# Patient Record
Sex: MALE | Race: WHITE | URBAN - METROPOLITAN AREA
[De-identification: names, ages, dates, MRNs, and addresses within clinical notes are randomized per-mention and may not be internally consistent; named-entity substitution may affect disease eponyms.]

---
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
## 2025-07-11 ENCOUNTER — OFFICE VISIT (OUTPATIENT)
Age: 20
End: 2025-07-11
Payer: COMMERCIAL

## 2025-07-11 VITALS
HEIGHT: 70 IN | BODY MASS INDEX: 34.28 KG/M2 | WEIGHT: 239.42 LBS | SYSTOLIC BLOOD PRESSURE: 130 MMHG | TEMPERATURE: 98.8 F | DIASTOLIC BLOOD PRESSURE: 70 MMHG | RESPIRATION RATE: 18 BRPM | OXYGEN SATURATION: 98 % | HEART RATE: 73 BPM

## 2025-07-11 DIAGNOSIS — J02.0 STREP PHARYNGITIS: Primary | ICD-10-CM

## 2025-07-11 LAB — S PYO AG THROAT QL: POSITIVE

## 2025-07-11 PROCEDURE — S9083 URGENT CARE CENTER GLOBAL: HCPCS | Performed by: EMERGENCY MEDICINE

## 2025-07-11 PROCEDURE — 87880 STREP A ASSAY W/OPTIC: CPT | Performed by: EMERGENCY MEDICINE

## 2025-07-11 PROCEDURE — G0382 LEV 3 HOSP TYPE B ED VISIT: HCPCS | Performed by: EMERGENCY MEDICINE

## 2025-07-11 RX ORDER — AMOXICILLIN 500 MG/1
500 CAPSULE ORAL EVERY 8 HOURS SCHEDULED
Qty: 30 CAPSULE | Refills: 0 | Status: SHIPPED | OUTPATIENT
Start: 2025-07-11 | End: 2025-07-21

## 2025-07-11 RX ORDER — PREDNISONE 10 MG/1
TABLET ORAL
Qty: 21 TABLET | Refills: 0 | Status: SHIPPED | OUTPATIENT
Start: 2025-07-11

## 2025-07-11 NOTE — PATIENT INSTRUCTIONS
Patient Education     Strep throat in adults   The Basics   Written by the doctors and editors at [facility]   What is strep throat? -- Strep throat is an infection caused by a certain type of bacteria. It leads to a sore throat.  Most sore throats are caused by a virus, and are not strep throat. Only about 1 in 10 adults who seek medical care for sore throat have strep throat. But if you do have strep throat, you will need treatment with antibiotics.  How can I tell if I have strep throat? -- It is hard to tell the difference between strep throat and a sore throat caused by a virus. But there are some clues that you can look for.  People who have strep throat often have:   Severe throat pain   Fever (temperature higher than 100.4°F or 38°C)   Swollen glands in the neck  You might also be able to see redness on the roof of your mouth, or white patches in the back of your throat (figure 1).  People who have strep throat usually do not have a cough, runny nose, or itchy or red eyes. These symptoms are more common when the sore throat is caused by a virus.  Is there a test for strep throat? -- Yes. If you think that you might have strep throat, a doctor or nurse can easily check for it. They can run a swab along the back of your throat, and test it for the bacteria that cause strep throat.  Do I need antibiotics? -- Yes. If a test shows that you have strep throat, then you need antibiotics. Antibiotics can help reduce your symptoms and keep the infection from spreading to other people as easily.  Antibiotics can also prevent problems that strep throat can sometimes cause. These can happen if:   The body reacts to the infection - This can cause symptoms like skin rash, joint pain, and even organ damage. In some cases, this can be serious.   The bacteria spread to nearby areas - For example, this could cause an ear, sinus, or skin infection. It could also cause swelling or abscesses (pockets of pus) in the throat.  You  will probably be prescribed antibiotics to take for 10 days. It's important to take all of your antibiotics, even if you start to feel better sooner.  What can I do to feel better? -- Follow your doctor's instructions for taking your antibiotics.  There are also other ways to help relieve symptoms:   Take over-the-counter pain medicine - Acetaminophen (sample brand name: Tylenol) or ibuprofen (sample brand names: Advil, Motrin) can help with throat pain.   Use sore throat lozenges or sprays - Using medicated sore throat lozenges or throat sprays can temporarily reduce throat pain.   Suck on hard candies, ice chips, or ice pops.   Gargle with salt water - Some people find that this helps with throat pain.   Use a cool mist humidifier - This adds moisture to the air to keep the throat from getting too dry and might help with pain.   Avoid smoking or being around people who are smoking - Smoke can make throat pain worse.  When can I go back to work or school? -- Doctors usually recommend waiting 1 day after starting antibiotics before returning to work or school. By then, you will be a lot less likely to spread the infection to others.  What problems should I watch for? -- If strep throat is not treated with antibiotics, it can lead to other problems.  Call your doctor or nurse for advice if:   You are having trouble getting enough to eat or drink.   You still have symptoms after you finish your antibiotics.   You develop a red rash or peeling skin.   You develop joint pain within 1 month of having strep throat.   Your urine becomes red or brown.   You start having new symptoms.  What can I do to prevent getting strep throat again? -- Wash your hands often with soap and water (figure 2). This is one of the best ways to prevent the spread of infection.  All topics are updated as new evidence becomes available and our peer review process is complete.  This topic retrieved from [employer] on: Feb 26, 2024.  Topic 297625  Version 2.0  Release: 32.2.4 - C32.56  © 2024 UpToDate, Inc. and/or its affiliates. All rights reserved.  figure 1: Strep throat     Strep throat can make the roof of your mouth turn red and your tonsils white. It can also make your uvula swell.  Graphic 16950 Version 6.0  figure 2: How to wash your hands     Wet your hands with clean water, and apply a small amount of soap. Lather and rub hands together for at least 20 seconds. Clean your wrists, palms, backs of your hands, between your fingers, tips of your fingers, thumbs, and under and around your nails. Rinse well, and dry your hands using a clean towel.  Graphic 734366 Version 7.0  Consumer Information Use and Disclaimer   Disclaimer: This generalized information is a limited summary of diagnosis, treatment, and/or medication information. It is not meant to be comprehensive and should be used as a tool to help the user understand and/or assess potential diagnostic and treatment options. It does NOT include all information about conditions, treatments, medications, side effects, or risks that may apply to a specific patient. It is not intended to be medical advice or a substitute for the medical advice, diagnosis, or treatment of a health care provider based on the health care provider's examination and assessment of a patient's specific and unique circumstances. Patients must speak with a health care provider for complete information about their health, medical questions, and treatment options, including any risks or benefits regarding use of medications. This information does not endorse any treatments or medications as safe, effective, or approved for treating a specific patient. UpToDate, Inc. and its affiliates disclaim any warranty or liability relating to this information or the use thereof.The use of this information is governed by the Terms of Use, available at https://www.[employer].com/en/know/clinical-effectiveness-terms. 2024© UpToDate, Inc. and  its affiliates and/or licensors. All rights reserved.  Copyright   © 2024 [employer] and/or its affiliates. All rights reserved.

## 2025-07-11 NOTE — PROGRESS NOTES
[facility]        NAME: Nixon Delgado is a 20 y.o. male  : 2005    MRN: [id number]  DATE: 2025  TIME: 9:00 AM    Assessment and Plan   Strep pharyngitis [J02.0]  1. Strep pharyngitis  predniSONE 10 mg tablet    POCT rapid ANTIGEN strepA    amoxicillin (AMOXIL) 500 mg capsule            Patient Instructions     Patient Instructions   Patient Education     Strep throat in adults   The Basics   Written by the doctors and editors at [facility]   What is strep throat? -- Strep throat is an infection caused by a certain type of bacteria. It leads to a sore throat.  Most sore throats are caused by a virus, and are not strep throat. Only about 1 in 10 adults who seek medical care for sore throat have strep throat. But if you do have strep throat, you will need treatment with antibiotics.  How can I tell if I have strep throat? -- It is hard to tell the difference between strep throat and a sore throat caused by a virus. But there are some clues that you can look for.  People who have strep throat often have:   Severe throat pain   Fever (temperature higher than 100.4°F or 38°C)   Swollen glands in the neck  You might also be able to see redness on the roof of your mouth, or white patches in the back of your throat (figure 1).  People who have strep throat usually do not have a cough, runny nose, or itchy or red eyes. These symptoms are more common when the sore throat is caused by a virus.  Is there a test for strep throat? -- Yes. If you think that you might have strep throat, a doctor or nurse can easily check for it. They can run a swab along the back of your throat, and test it for the bacteria that cause strep throat.  Do I need antibiotics? -- Yes. If a test shows that you have strep throat, then you need antibiotics. Antibiotics can help reduce your symptoms and keep the infection from spreading to other people as easily.  Antibiotics can also prevent problems that strep throat can sometimes  cause. These can happen if:   The body reacts to the infection - This can cause symptoms like skin rash, joint pain, and even organ damage. In some cases, this can be serious.   The bacteria spread to nearby areas - For example, this could cause an ear, sinus, or skin infection. It could also cause swelling or abscesses (pockets of pus) in the throat.  You will probably be prescribed antibiotics to take for 10 days. It's important to take all of your antibiotics, even if you start to feel better sooner.  What can I do to feel better? -- Follow your doctor's instructions for taking your antibiotics.  There are also other ways to help relieve symptoms:   Take over-the-counter pain medicine - Acetaminophen (sample brand name: Tylenol) or ibuprofen (sample brand names: Advil, Motrin) can help with throat pain.   Use sore throat lozenges or sprays - Using medicated sore throat lozenges or throat sprays can temporarily reduce throat pain.   Suck on hard candies, ice chips, or ice pops.   Gargle with salt water - Some people find that this helps with throat pain.   Use a cool mist humidifier - This adds moisture to the air to keep the throat from getting too dry and might help with pain.   Avoid smoking or being around people who are smoking - Smoke can make throat pain worse.  When can I go back to work or school? -- Doctors usually recommend waiting 1 day after starting antibiotics before returning to work or school. By then, you will be a lot less likely to spread the infection to others.  What problems should I watch for? -- If strep throat is not treated with antibiotics, it can lead to other problems.  Call your doctor or nurse for advice if:   You are having trouble getting enough to eat or drink.   You still have symptoms after you finish your antibiotics.   You develop a red rash or peeling skin.   You develop joint pain within 1 month of having strep throat.   Your urine becomes red or brown.   You start having  new symptoms.  What can I do to prevent getting strep throat again? -- Wash your hands often with soap and water (figure 2). This is one of the best ways to prevent the spread of infection.  All topics are updated as new evidence becomes available and our peer review process is complete.  This topic retrieved from [employer] on: Feb 26, 2024.  Topic 919038 Version 2.0  Release: 32.2.4 - C32.56  © 2024 UpToDate, Inc. and/or its affiliates. All rights reserved.  figure 1: Strep throat     Strep throat can make the roof of your mouth turn red and your tonsils white. It can also make your uvula swell.  Graphic 47408 Version 6.0  figure 2: How to wash your hands     Wet your hands with clean water, and apply a small amount of soap. Lather and rub hands together for at least 20 seconds. Clean your wrists, palms, backs of your hands, between your fingers, tips of your fingers, thumbs, and under and around your nails. Rinse well, and dry your hands using a clean towel.  Graphic 109096 Version 7.0  Consumer Information Use and Disclaimer   Disclaimer: This generalized information is a limited summary of diagnosis, treatment, and/or medication information. It is not meant to be comprehensive and should be used as a tool to help the user understand and/or assess potential diagnostic and treatment options. It does NOT include all information about conditions, treatments, medications, side effects, or risks that may apply to a specific patient. It is not intended to be medical advice or a substitute for the medical advice, diagnosis, or treatment of a health care provider based on the health care provider's examination and assessment of a patient's specific and unique circumstances. Patients must speak with a health care provider for complete information about their health, medical questions, and treatment options, including any risks or benefits regarding use of medications. This information does not endorse any treatments or  medications as safe, effective, or approved for treating a specific patient. UpToDate, Inc. and its affiliates disclaim any warranty or liability relating to this information or the use thereof.The use of this information is governed by the Terms of Use, available at https://www.[employer].com/en/know/clinical-effectiveness-terms. 2024© UpToDate, Inc. and its affiliates and/or licensors. All rights reserved.  Copyright   © 2024 UpToDate, Inc. and/or its affiliates. All rights reserved.        Follow up with PCP in 3-5 days.  Proceed to  ER if symptoms worsen.    Chief Complaint     Chief Complaint   Patient presents with    Sore Throat     About five days ago he began experiencing a sore throat. Pt claims he is now losing his voice and hurts to swallow. He has been taking tylenol and robitussin.          History of Present Illness       Planes of sore throat, hoarse voice, congestion for the past 5 days.    Sore Throat   Associated symptoms include congestion. Pertinent negatives include no coughing, diarrhea, ear discharge, headaches, shortness of breath or vomiting.       Review of Systems   Review of Systems   Constitutional:  Negative for chills and fever.   HENT:  Positive for congestion and sore throat. Negative for ear discharge, hearing loss, rhinorrhea and sinus pressure.    Respiratory:  Negative for cough, chest tightness, shortness of breath and wheezing.    Gastrointestinal:  Negative for diarrhea and vomiting.   Musculoskeletal:  Negative for neck stiffness.   Skin:  Negative for pallor.   Neurological:  Negative for headaches.         Current Medications     Current Medications[1]    Current Allergies     Allergies as of 07/11/2025 - Reviewed 07/11/2025   Allergen Reaction Noted    Phenylephrine Other (See Comments) 03/25/2015            The following portions of the patient's history were reviewed and updated as appropriate: allergies, current medications, past family history, past medical  "history, past social history, past surgical history and problem list.     Past Medical History[2]    Past Surgical History[3]    Family History[4]      Medications have been verified.        Objective   /70 (BP Location: Right arm, Patient Position: Sitting, Cuff Size: Large)   Pulse 73   Temp 98.8 °F (37.1 °C) (Oral)   Resp 18   Ht 5' 10\" (1.778 m)   Wt 109 kg (239 lb 6.7 oz)   SpO2 98%   BMI 34.35 kg/m²        Physical Exam     Physical Exam  Vitals and nursing note reviewed.   Constitutional:       General: He is not in acute distress.     Appearance: Normal appearance. He is well-developed. He is not ill-appearing or diaphoretic.   HENT:      Head: Normocephalic and atraumatic.      Right Ear: External ear normal.      Left Ear: External ear normal.      Nose: Mucosal edema and congestion present. No rhinorrhea.      Mouth/Throat:      Pharynx: Posterior oropharyngeal erythema present.      Tonsils: No tonsillar exudate or tonsillar abscesses. 1+ on the right. 1+ on the left.     Eyes:      Conjunctiva/sclera: Conjunctivae normal.      Pupils: Pupils are equal, round, and reactive to light.       Cardiovascular:      Rate and Rhythm: Normal rate and regular rhythm.      Heart sounds: Normal heart sounds.   Pulmonary:      Effort: Pulmonary effort is normal. No respiratory distress.      Breath sounds: Normal breath sounds. No wheezing, rhonchi or rales.     Musculoskeletal:      Cervical back: Neck supple.   Lymphadenopathy:      Cervical: No cervical adenopathy.     Skin:     General: Skin is warm and dry.      Coloration: Skin is not pale.      Findings: No rash.     Neurological:      Mental Status: He is alert and oriented to person, place, and time.     Psychiatric:         Mood and Affect: Mood normal.         Behavior: Behavior normal.         Thought Content: Thought content normal.         Judgment: Judgment normal.                        [1]   Current Outpatient Medications:     " amoxicillin (AMOXIL) 500 mg capsule, Take 1 capsule (500 mg total) by mouth every 8 (eight) hours for 10 days, Disp: 30 capsule, Rfl: 0    predniSONE 10 mg tablet, Take once daily all day's pills on this schedule  6- 5- 4- 3- 2- 1, Disp: 21 tablet, Rfl: 0  [2]   Past Medical History:  Diagnosis Date    Kawasaki disease (HCC)    [3] No past surgical history on file.  [4]   Family History  Problem Relation Name Age of Onset    No Known Problems Mother      No Known Problems Father